# Patient Record
Sex: FEMALE | Race: WHITE | NOT HISPANIC OR LATINO | Employment: OTHER | URBAN - METROPOLITAN AREA
[De-identification: names, ages, dates, MRNs, and addresses within clinical notes are randomized per-mention and may not be internally consistent; named-entity substitution may affect disease eponyms.]

---

## 2023-03-14 ENCOUNTER — OFFICE VISIT (OUTPATIENT)
Dept: NEUROSURGERY | Facility: CLINIC | Age: 68
End: 2023-03-14
Payer: MEDICARE

## 2023-03-14 DIAGNOSIS — M47.819 FACET ARTHROPATHY, MULTILEVEL: ICD-10-CM

## 2023-03-14 DIAGNOSIS — M51.16 LUMBAR DISC DISEASE WITH RADICULOPATHY: Primary | ICD-10-CM

## 2023-03-14 PROCEDURE — 3288F PR FALLS RISK ASSESSMENT DOCUMENTED: ICD-10-PCS | Mod: CPTII,S$GLB,, | Performed by: NEUROLOGICAL SURGERY

## 2023-03-14 PROCEDURE — 99999 PR PBB SHADOW E&M-EST. PATIENT-LVL III: ICD-10-PCS | Mod: PBBFAC,,, | Performed by: NEUROLOGICAL SURGERY

## 2023-03-14 PROCEDURE — 99204 OFFICE O/P NEW MOD 45 MIN: CPT | Mod: S$GLB,,, | Performed by: NEUROLOGICAL SURGERY

## 2023-03-14 PROCEDURE — 1101F PR PT FALLS ASSESS DOC 0-1 FALLS W/OUT INJ PAST YR: ICD-10-PCS | Mod: CPTII,S$GLB,, | Performed by: NEUROLOGICAL SURGERY

## 2023-03-14 PROCEDURE — 1159F MED LIST DOCD IN RCRD: CPT | Mod: CPTII,S$GLB,, | Performed by: NEUROLOGICAL SURGERY

## 2023-03-14 PROCEDURE — 1125F PR PAIN SEVERITY QUANTIFIED, PAIN PRESENT: ICD-10-PCS | Mod: CPTII,S$GLB,, | Performed by: NEUROLOGICAL SURGERY

## 2023-03-14 PROCEDURE — 99999 PR PBB SHADOW E&M-EST. PATIENT-LVL III: CPT | Mod: PBBFAC,,, | Performed by: NEUROLOGICAL SURGERY

## 2023-03-14 PROCEDURE — 1159F PR MEDICATION LIST DOCUMENTED IN MEDICAL RECORD: ICD-10-PCS | Mod: CPTII,S$GLB,, | Performed by: NEUROLOGICAL SURGERY

## 2023-03-14 PROCEDURE — 1125F AMNT PAIN NOTED PAIN PRSNT: CPT | Mod: CPTII,S$GLB,, | Performed by: NEUROLOGICAL SURGERY

## 2023-03-14 PROCEDURE — 1101F PT FALLS ASSESS-DOCD LE1/YR: CPT | Mod: CPTII,S$GLB,, | Performed by: NEUROLOGICAL SURGERY

## 2023-03-14 PROCEDURE — 99204 PR OFFICE/OUTPT VISIT, NEW, LEVL IV, 45-59 MIN: ICD-10-PCS | Mod: S$GLB,,, | Performed by: NEUROLOGICAL SURGERY

## 2023-03-14 PROCEDURE — 3288F FALL RISK ASSESSMENT DOCD: CPT | Mod: CPTII,S$GLB,, | Performed by: NEUROLOGICAL SURGERY

## 2023-03-14 RX ORDER — LOSARTAN POTASSIUM 100 MG/1
TABLET ORAL
COMMUNITY

## 2023-03-14 RX ORDER — NAPROXEN SODIUM 220 MG/1
81 TABLET, FILM COATED ORAL DAILY
COMMUNITY

## 2023-03-14 RX ORDER — PNV NO.95/FERROUS FUM/FOLIC AC 28MG-0.8MG
100 TABLET ORAL DAILY
COMMUNITY

## 2023-03-14 RX ORDER — PROPRANOLOL HYDROCHLORIDE 10 MG/1
TABLET ORAL
COMMUNITY

## 2023-03-22 ENCOUNTER — TELEPHONE (OUTPATIENT)
Dept: NEUROSURGERY | Facility: CLINIC | Age: 68
End: 2023-03-22
Payer: MEDICARE

## 2023-03-22 NOTE — TELEPHONE ENCOUNTER
Spoke to patient. I told her Dr. Olivier has not yet put in the orders from her visit. I told her once the orders are in, I will reach out to get everything scheduled. She v/u        ----- Message from Tonio Fernandez sent at 3/22/2023  9:10 AM CDT -----  Regarding: Back Brace and Injection  Contact: @[phone]  Pt requesting a call back from Sharif regarding a back brace and a injection that was supposed to be set up in [place].  Pt states she has not received a call back.  Please call to discuss further.

## 2023-03-24 ENCOUNTER — TELEPHONE (OUTPATIENT)
Dept: NEUROSURGERY | Facility: CLINIC | Age: 68
End: 2023-03-24
Payer: MEDICARE

## 2023-03-28 RX ORDER — GABAPENTIN 300 MG/1
300 CAPSULE ORAL 3 TIMES DAILY
Qty: 90 CAPSULE | Refills: 0 | Status: SHIPPED | OUTPATIENT
Start: 2023-03-28 | End: 2023-04-27

## 2023-03-28 NOTE — PROGRESS NOTES
Neurosurgery  History & Physical    SUBJECTIVE:     Chief Complaint:  Patient referred to me for evaluation of lumbar disc disease    History of Present Illness:  This is a 67-year-old female with a long history of back pain.  Patient has had epidural injections in the past has had some help but over the last couple years patient's lower back patient has gotten progressively worse now with associated left leg pain and numbness.  Patient has had multiple injections at L3-L4 L4-5 as well as radiofrequency ablation is and SI joint injections with only temporary and limited relief.  Patient now continues to have left leg pain down to the left knee but not down to the foot.  Denies bowel or bladder issues.  Denies any right leg symptoms.    Review of patient's allergies indicates:  No Known Allergies    Current Outpatient Medications   Medication Sig Dispense Refill    ascorbic acid, vitamin C, (VITAMIN C) 100 MG tablet Take 100 mg by mouth once daily.      aspirin 81 MG Chew Take 81 mg by mouth once daily.      calcium carbonate 1250 MG capsule Take 1,250 mg by mouth.      cyanocobalamin (VITAMIN B-12) 100 MCG tablet Take 100 mcg by mouth once daily.      denosumab (PROLIA) 60 mg/mL Syrg Inject 60 mg into the skin.      FLUoxetine 10 MG capsule TAKE 1 CAPSULE EVERY DAY 90 capsule 3    levocetirizine (XYZAL) 5 MG tablet Take 1 tablet by mouth every evening.      losartan (COZAAR) 100 MG tablet losartan Take No date recorded No form recorded No frequency recorded No route recorded No set duration recorded No set duration amount recorded active No dosage strength recorded No dosage strength units of measure recorded      meloxicam (MOBIC) 15 MG tablet meloxicam Take No date recorded No form recorded No frequency recorded No route recorded No set duration recorded No set duration amount recorded active No dosage strength recorded No dosage strength units of measure recorded      multivitamin capsule Take by mouth once  daily.      pantoprazole (PROTONIX) 40 MG tablet Take 1 tablet by mouth every morning.      rosuvastatin (CRESTOR) 5 MG tablet       estradioL (VAGIFEM) 10 mcg Tab Place 1 tablet (10 mcg total) vaginally twice a week. 8 tablet 2    estradioL (VAGIFEM) 10 mcg Tab Place 1 tablet (10 mcg total) vaginally twice a week. 24 tablet 3    levocetirizine dihydrochloride (LEVOCETIRIZINE ORAL) levocetirizine Take No date recorded No form recorded No frequency recorded No route recorded No set duration recorded No set duration amount recorded suspended No dosage strength recorded No dosage strength units of measure recorded      propranoloL (INDERAL) 10 MG tablet propranolol Take No date recorded No form recorded No frequency recorded No route recorded No set duration recorded No set duration amount recorded suspended No dosage strength recorded No dosage strength units of measure recorded       No current facility-administered medications for this visit.       Past Medical History:   Diagnosis Date    Hyperlipidemia     Mental disorder      No past surgical history on file.  Family History       Problem Relation (Age of Onset)    Stroke Mother          Social History     Socioeconomic History    Marital status: Legally    Tobacco Use    Smoking status: Never    Smokeless tobacco: Never   Substance and Sexual Activity    Alcohol use: Not Currently    Drug use: Never    Sexual activity: Yes     Partners: Male     Birth control/protection: Post-menopausal       Review of Systems    OBJECTIVE:     Vital Signs  Pain Score:   6  There is no height or weight on file to calculate BMI.      Neurosurgery Physical Exam      Diagnostic Results:  I reviewed outside imaging.  Patient had MRI scan on 03/13/2023 as well as x-rays.  Patient has multilevel degenerative disc disease but no large disc herniation or central canal stenosis.  Patient has a disc bulge at L1-L2.  Patient has loss of lumbar lordosis.  No obvious fracture or  dislocation.    ASSESSMENT/PLAN:     Patient with chronic low back pain with left-sided L3-L4 radiculopathy.  I do not see any particularly compressive at L3-L4 or L2-L3.  But patient does have a disc disease at L1-L2.  At this stage I would like to try a L1-L2 bilateral facet injections try left-sided transforaminal L2-L3 injection, get patient activity back brace and try Neurontin.  Overall I do not think this is something that we will be surgical so the patient may benefit from a healthy back programmed referral.        Note dictated with voice recognition software, please excuse any grammatical errors.

## 2023-04-11 ENCOUNTER — TELEPHONE (OUTPATIENT)
Dept: INTERVENTIONAL RADIOLOGY/VASCULAR | Facility: CLINIC | Age: 68
End: 2023-04-11
Payer: MEDICARE

## 2023-04-11 NOTE — TELEPHONE ENCOUNTER
Called and spoke to patient to schedule left L2-L3 transforaminal injection and L1-L2 Nicholas Facet injection. Ref by Dr. Olivier. Patient stated she lives in [place], [place] and would like to have procedure done close to [place] or [place]. Explained to patient I do not schedule at those facility and will send message to Dr. Olivier.